# Patient Record
Sex: MALE | Race: BLACK OR AFRICAN AMERICAN | Employment: OTHER | URBAN - METROPOLITAN AREA
[De-identification: names, ages, dates, MRNs, and addresses within clinical notes are randomized per-mention and may not be internally consistent; named-entity substitution may affect disease eponyms.]

---

## 2017-03-03 ENCOUNTER — HOSPITAL ENCOUNTER (EMERGENCY)
Age: 32
Discharge: HOME | End: 2017-03-03
Attending: EMERGENCY MEDICINE
Payer: OTHER GOVERNMENT

## 2017-03-03 ENCOUNTER — APPOINTMENT (OUTPATIENT)
Dept: CT IMAGING | Age: 32
End: 2017-03-03
Attending: EMERGENCY MEDICINE
Payer: OTHER GOVERNMENT

## 2017-03-03 ENCOUNTER — APPOINTMENT (OUTPATIENT)
Dept: GENERAL RADIOLOGY | Age: 32
End: 2017-03-03
Attending: NURSE PRACTITIONER
Payer: OTHER GOVERNMENT

## 2017-03-03 VITALS
DIASTOLIC BLOOD PRESSURE: 78 MMHG | TEMPERATURE: 97.4 F | SYSTOLIC BLOOD PRESSURE: 127 MMHG | OXYGEN SATURATION: 100 % | WEIGHT: 134 LBS | HEART RATE: 88 BPM | RESPIRATION RATE: 18 BRPM | HEIGHT: 65 IN | BODY MASS INDEX: 22.33 KG/M2

## 2017-03-03 DIAGNOSIS — R10.13 ABDOMINAL PAIN, EPIGASTRIC: Primary | ICD-10-CM

## 2017-03-03 LAB
ALBUMIN SERPL BCP-MCNC: 4.1 G/DL (ref 3.4–5)
ALBUMIN/GLOB SERPL: 0.9 {RATIO} (ref 0.8–1.7)
ALP SERPL-CCNC: 81 U/L (ref 45–117)
ALT SERPL-CCNC: 34 U/L (ref 16–61)
AMPHET UR QL SCN: NEGATIVE
ANION GAP BLD CALC-SCNC: 9 MMOL/L (ref 3–18)
APPEARANCE UR: CLEAR
AST SERPL W P-5'-P-CCNC: 24 U/L (ref 15–37)
ATRIAL RATE: 75 BPM
BACTERIA URNS QL MICRO: ABNORMAL /HPF
BARBITURATES UR QL SCN: NEGATIVE
BASOPHILS # BLD AUTO: 0 K/UL (ref 0–0.06)
BASOPHILS # BLD: 0 % (ref 0–2)
BENZODIAZ UR QL: NEGATIVE
BILIRUB SERPL-MCNC: 0.7 MG/DL (ref 0.2–1)
BILIRUB UR QL: NEGATIVE
BUN SERPL-MCNC: 20 MG/DL (ref 7–18)
BUN/CREAT SERPL: 16 (ref 12–20)
CALCIUM SERPL-MCNC: 9.1 MG/DL (ref 8.5–10.1)
CALCULATED P AXIS, ECG09: 69 DEGREES
CALCULATED R AXIS, ECG10: 89 DEGREES
CALCULATED T AXIS, ECG11: 8 DEGREES
CANNABINOIDS UR QL SCN: NEGATIVE
CAOX CRY URNS QL MICRO: ABNORMAL
CHLORIDE SERPL-SCNC: 99 MMOL/L (ref 100–108)
CO2 SERPL-SCNC: 26 MMOL/L (ref 21–32)
COCAINE UR QL SCN: NEGATIVE
COLOR UR: ABNORMAL
CREAT SERPL-MCNC: 1.23 MG/DL (ref 0.6–1.3)
DIAGNOSIS, 93000: NORMAL
DIFFERENTIAL METHOD BLD: ABNORMAL
EOSINOPHIL # BLD: 0.1 K/UL (ref 0–0.4)
EOSINOPHIL NFR BLD: 1 % (ref 0–5)
EPITH CASTS URNS QL MICRO: ABNORMAL /LPF (ref 0–5)
ERYTHROCYTE [DISTWIDTH] IN BLOOD BY AUTOMATED COUNT: 11.7 % (ref 11.6–14.5)
GLOBULIN SER CALC-MCNC: 4.6 G/DL (ref 2–4)
GLUCOSE SERPL-MCNC: 80 MG/DL (ref 74–99)
GLUCOSE UR STRIP.AUTO-MCNC: NEGATIVE MG/DL
HCT VFR BLD AUTO: 47.6 % (ref 36–48)
HDSCOM,HDSCOM: NORMAL
HGB BLD-MCNC: 16.7 G/DL (ref 13–16)
HGB UR QL STRIP: NEGATIVE
KETONES UR QL STRIP.AUTO: 15 MG/DL
LEUKOCYTE ESTERASE UR QL STRIP.AUTO: NEGATIVE
LIPASE SERPL-CCNC: 122 U/L (ref 73–393)
LYMPHOCYTES # BLD AUTO: 30 % (ref 21–52)
LYMPHOCYTES # BLD: 1.5 K/UL (ref 0.9–3.6)
MAGNESIUM SERPL-MCNC: 2.2 MG/DL (ref 1.8–2.4)
MCH RBC QN AUTO: 31.3 PG (ref 24–34)
MCHC RBC AUTO-ENTMCNC: 35.1 G/DL (ref 31–37)
MCV RBC AUTO: 89.1 FL (ref 74–97)
METHADONE UR QL: NEGATIVE
MONOCYTES # BLD: 0.8 K/UL (ref 0.05–1.2)
MONOCYTES NFR BLD AUTO: 17 % (ref 3–10)
NEUTS SEG # BLD: 2.4 K/UL (ref 1.8–8)
NEUTS SEG NFR BLD AUTO: 52 % (ref 40–73)
NITRITE UR QL STRIP.AUTO: NEGATIVE
OPIATES UR QL: NEGATIVE
P-R INTERVAL, ECG05: 140 MS
PCP UR QL: NEGATIVE
PH UR STRIP: 6 [PH] (ref 5–8)
PLATELET # BLD AUTO: 210 K/UL (ref 135–420)
PMV BLD AUTO: 9.6 FL (ref 9.2–11.8)
POTASSIUM SERPL-SCNC: 3.5 MMOL/L (ref 3.5–5.5)
PROT SERPL-MCNC: 8.7 G/DL (ref 6.4–8.2)
PROT UR STRIP-MCNC: 100 MG/DL
Q-T INTERVAL, ECG07: 356 MS
QRS DURATION, ECG06: 80 MS
QTC CALCULATION (BEZET), ECG08: 397 MS
RBC # BLD AUTO: 5.34 M/UL (ref 4.7–5.5)
RBC #/AREA URNS HPF: NEGATIVE /HPF (ref 0–5)
SODIUM SERPL-SCNC: 134 MMOL/L (ref 136–145)
SP GR UR REFRACTOMETRY: >1.03 (ref 1–1.03)
UROBILINOGEN UR QL STRIP.AUTO: 0.2 EU/DL (ref 0.2–1)
VENTRICULAR RATE, ECG03: 75 BPM
WBC # BLD AUTO: 4.8 K/UL (ref 4.6–13.2)
WBC URNS QL MICRO: NEGATIVE /HPF (ref 0–4)

## 2017-03-03 PROCEDURE — 83735 ASSAY OF MAGNESIUM: CPT | Performed by: NURSE PRACTITIONER

## 2017-03-03 PROCEDURE — 74011636320 HC RX REV CODE- 636/320: Performed by: EMERGENCY MEDICINE

## 2017-03-03 PROCEDURE — 83690 ASSAY OF LIPASE: CPT | Performed by: NURSE PRACTITIONER

## 2017-03-03 PROCEDURE — 85025 COMPLETE CBC W/AUTO DIFF WBC: CPT | Performed by: NURSE PRACTITIONER

## 2017-03-03 PROCEDURE — 93005 ELECTROCARDIOGRAM TRACING: CPT

## 2017-03-03 PROCEDURE — 74011250636 HC RX REV CODE- 250/636: Performed by: EMERGENCY MEDICINE

## 2017-03-03 PROCEDURE — 80053 COMPREHEN METABOLIC PANEL: CPT | Performed by: NURSE PRACTITIONER

## 2017-03-03 PROCEDURE — 74022 RADEX COMPL AQT ABD SERIES: CPT

## 2017-03-03 PROCEDURE — 99284 EMERGENCY DEPT VISIT MOD MDM: CPT

## 2017-03-03 PROCEDURE — 80307 DRUG TEST PRSMV CHEM ANLYZR: CPT | Performed by: NURSE PRACTITIONER

## 2017-03-03 PROCEDURE — 96374 THER/PROPH/DIAG INJ IV PUSH: CPT

## 2017-03-03 PROCEDURE — 81001 URINALYSIS AUTO W/SCOPE: CPT | Performed by: NURSE PRACTITIONER

## 2017-03-03 PROCEDURE — 74011000250 HC RX REV CODE- 250: Performed by: EMERGENCY MEDICINE

## 2017-03-03 PROCEDURE — 96361 HYDRATE IV INFUSION ADD-ON: CPT

## 2017-03-03 PROCEDURE — 74011250637 HC RX REV CODE- 250/637: Performed by: EMERGENCY MEDICINE

## 2017-03-03 PROCEDURE — 74011250636 HC RX REV CODE- 250/636: Performed by: NURSE PRACTITIONER

## 2017-03-03 PROCEDURE — 96375 TX/PRO/DX INJ NEW DRUG ADDON: CPT

## 2017-03-03 PROCEDURE — 74177 CT ABD & PELVIS W/CONTRAST: CPT

## 2017-03-03 RX ORDER — RANITIDINE 150 MG/1
150 TABLET, FILM COATED ORAL 2 TIMES DAILY
Qty: 60 TAB | Refills: 0 | Status: SHIPPED | OUTPATIENT
Start: 2017-03-03

## 2017-03-03 RX ORDER — ONDANSETRON 4 MG/1
4 TABLET, ORALLY DISINTEGRATING ORAL
Qty: 15 TAB | Refills: 0 | Status: SHIPPED | OUTPATIENT
Start: 2017-03-03

## 2017-03-03 RX ORDER — ONDANSETRON 4 MG/1
4 TABLET, ORALLY DISINTEGRATING ORAL
COMMUNITY
End: 2017-03-03

## 2017-03-03 RX ORDER — ONDANSETRON 2 MG/ML
4 INJECTION INTRAMUSCULAR; INTRAVENOUS
Status: COMPLETED | OUTPATIENT
Start: 2017-03-03 | End: 2017-03-03

## 2017-03-03 RX ORDER — KETOROLAC TROMETHAMINE 30 MG/ML
15 INJECTION, SOLUTION INTRAMUSCULAR; INTRAVENOUS
Status: COMPLETED | OUTPATIENT
Start: 2017-03-03 | End: 2017-03-03

## 2017-03-03 RX ADMIN — ONDANSETRON 4 MG: 2 INJECTION INTRAMUSCULAR; INTRAVENOUS at 06:26

## 2017-03-03 RX ADMIN — SODIUM CHLORIDE 1000 ML: 900 INJECTION, SOLUTION INTRAVENOUS at 06:29

## 2017-03-03 RX ADMIN — SODIUM CHLORIDE 1000 ML: 900 INJECTION, SOLUTION INTRAVENOUS at 07:25

## 2017-03-03 RX ADMIN — IOPAMIDOL 94 ML: 612 INJECTION, SOLUTION INTRAVENOUS at 07:47

## 2017-03-03 RX ADMIN — LIDOCAINE HYDROCHLORIDE 50 ML: 20 SOLUTION ORAL; TOPICAL at 08:17

## 2017-03-03 RX ADMIN — KETOROLAC TROMETHAMINE 15 MG: 30 INJECTION, SOLUTION INTRAMUSCULAR at 06:27

## 2017-03-03 NOTE — ED TRIAGE NOTES
Patient states he began having abdominal pain and chest pain after eating french fries and a shake this morning. 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

## 2017-03-03 NOTE — DISCHARGE INSTRUCTIONS

## 2017-03-03 NOTE — ED PROVIDER NOTES
HPI Comments: Patient with no medical history presents via walk-in triage with epigastric pain started at 12:00 last night, last ate french fries. Reports a prior evaluation for this that was unremarkable and he states the only thing that they did for him was given morphine and Dilaudid. He denies any testicle pain denies any dysuria denies any vomiting or diarrhea. Does admit to prior difficulty with tolerating fatty foods. Pain is in the epigastrium radiating upwards and sometimes down to the LEFT lower quadrant feels like \"someone is putting her fist at me \"      Patient is a 36 y.o. male presenting with chest pain. Chest Pain (Angina)    Associated symptoms include abdominal pain. Pertinent negatives include no fever, no shortness of breath and no weakness. History reviewed. No pertinent past medical history. History reviewed. No pertinent surgical history. History reviewed. No pertinent family history. Social History     Social History    Marital status: LIFE PARTNER     Spouse name: N/A    Number of children: N/A    Years of education: N/A     Occupational History    Not on file. Social History Main Topics    Smoking status: Never Smoker    Smokeless tobacco: Current User    Alcohol use No    Drug use: No    Sexual activity: Not on file     Other Topics Concern    Not on file     Social History Narrative    No narrative on file         ALLERGIES: Review of patient's allergies indicates no known allergies. Review of Systems   Constitutional: Negative for fever. HENT: Negative for nosebleeds. Eyes: Negative for visual disturbance. Respiratory: Negative for shortness of breath. Cardiovascular: Positive for chest pain. Gastrointestinal: Positive for abdominal pain. Genitourinary: Negative for discharge and dysuria. Musculoskeletal: Negative for myalgias. Skin: Negative for rash. Neurological: Negative for weakness.    All other systems reviewed and are negative. Vitals:    03/03/17 0724 03/03/17 0753 03/03/17 0755 03/03/17 0800   BP: 140/73 126/81  127/78   Pulse:       Resp:       Temp:       SpO2: 100%  100% 100%   Weight:       Height:                Physical Exam   Constitutional:   General:  Well-developed, well-nourished, uncomfortable appearing nontoxic nondiaphoretic. Head:  Normocephalic atraumatic. Eyes:  Pupils midrange extraocular movements intact. No pallor or conjunctival injection. Nose:  No rhinorrhea, inspection grossly normal.    Ears:  Grossly normal to inspection, no discharge. Mouth:  Mucous membranes moist, no appreciable intraoral lesion. Neck:  Trachea midline, no asymmetry. Chest:  Grossly normal inspection, symmetric chest rise. Pulmonary:  Clear to auscultation bilaterally no wheezes rhonchi or rales. Cardiovascular:  S1-S2 no murmurs rubs or gallops. Abdomen: Tender to palpation mostly in epigastrium voluntary guarding no CVA tenderness bowel sounds are present. Extremities:  Grossly normal to inspection, peripheral pulses intact. Neurologic:  Alert and oriented no appreciable focal neurologic deficit. Psychiatric:  Grossly normal mood and affect. Nursing note reviewed, vital signs reviewed. MDM  Number of Diagnoses or Management Options  Abdominal pain, epigastric:   Diagnosis management comments: ED course:  Patient with abdominal pain started last night epigastric sometimes radiating down the LEFT lower quadrant but returning to the epigastrium. Seemed to be provoked by french fries, reports poor by mouth intake over last 2 days. Does have fatty food intolerance normally. Her lungs are clear, abdomen is tender predominantly epigastric voluntary guarding, peripheral pulses present and equal    Labs unremarkable. CT per radiology negative. He declined a rectal exam for blood    Patient's presentation, physical exam and workup reviewed.   Discussed the diagnostic uncertainty of abdominal pain. No further testing is indicated in the ER on this visit. Discussed possible empiric treatment for gastritis/ulcer with acid modifying medication and close follow up with PCP and possible referral for GI. Patient was comfortable with this plan, will return with persistent pain, vomiting, fever or with any concerns whatsoever. Disposition:    Discharged home      Portions of this chart were created with Dragon medical speech to text program.   Unrecognized errors may be present. Pt initially seen by me and turned over to Dr. Byron Kinney at [facility] on 3/3/17 awaiting results of labs, imaging, treatment, dx, and dispo. Delayed chart entry. 7:50 AM  [facility].  Mary Nicolas NP        ED Course       Procedures